# Patient Record
Sex: FEMALE | Race: BLACK OR AFRICAN AMERICAN | NOT HISPANIC OR LATINO | Employment: FULL TIME | URBAN - METROPOLITAN AREA
[De-identification: names, ages, dates, MRNs, and addresses within clinical notes are randomized per-mention and may not be internally consistent; named-entity substitution may affect disease eponyms.]

---

## 2024-03-04 ENCOUNTER — HOSPITAL ENCOUNTER (OUTPATIENT)
Facility: HOSPITAL | Age: 39
Discharge: HOME | End: 2024-03-04
Attending: EMERGENCY MEDICINE | Admitting: EMERGENCY MEDICINE
Payer: MEDICAID

## 2024-03-04 VITALS
DIASTOLIC BLOOD PRESSURE: 75 MMHG | OXYGEN SATURATION: 98 % | HEART RATE: 85 BPM | SYSTOLIC BLOOD PRESSURE: 128 MMHG | RESPIRATION RATE: 18 BRPM | BODY MASS INDEX: 33.41 KG/M2 | TEMPERATURE: 98.7 F | HEIGHT: 64 IN | WEIGHT: 195.7 LBS

## 2024-03-04 DIAGNOSIS — J10.1 INFLUENZA B: Primary | ICD-10-CM

## 2024-03-04 LAB
FLUAV SUBTYP SPEC NAA+PROBE: NOT DETECTED
FLUBV RNA ISLT QL NAA+PROBE: DETECTED
SARS-COV-2 RNA RESP QL NAA+PROBE: NOT DETECTED

## 2024-03-04 PROCEDURE — 87636 SARSCOV2 & INF A&B AMP PRB: CPT

## 2024-03-04 PROCEDURE — G0463 HOSPITAL OUTPT CLINIC VISIT: HCPCS

## 2024-03-04 RX ORDER — OSELTAMIVIR PHOSPHATE 75 MG/1
75 CAPSULE ORAL 2 TIMES DAILY
Qty: 10 CAPSULE | Refills: 0 | Status: SHIPPED | OUTPATIENT
Start: 2024-03-04 | End: 2024-03-09

## 2024-03-04 RX ORDER — ONDANSETRON 4 MG/1
4 TABLET, ORALLY DISINTEGRATING ORAL EVERY 8 HOURS PRN
Qty: 21 TABLET | Refills: 0 | Status: SHIPPED | OUTPATIENT
Start: 2024-03-04 | End: 2024-03-11

## 2024-03-04 RX ORDER — FLUTICASONE PROPIONATE 50 MCG
2 SPRAY, SUSPENSION (ML) NASAL DAILY
Qty: 11.1 ML | Refills: 0 | Status: SHIPPED | OUTPATIENT
Start: 2024-03-04

## 2024-03-04 NOTE — FSED PROVIDER NOTE
Subjective   History of Present Illness  38-year-old female reports a 1 to 2-day history of sinus pressure headache, reports drainage down the back of her throat nasal congestion occasional cough reports that a family member has had strep and another family member has fluid recently.  Denies nausea vomiting and diarrhea.        Review of Systems   All other systems reviewed and are negative.      History reviewed. No pertinent past medical history.    Allergies   Allergen Reactions    Morphine Hives and Itching       History reviewed. No pertinent surgical history.    History reviewed. No pertinent family history.    Social History     Socioeconomic History    Marital status:            Objective   Physical Exam  Constitutional:       Appearance: Normal appearance.   HENT:      Head: Normocephalic and atraumatic.      Right Ear: Tympanic membrane and ear canal normal.      Left Ear: Tympanic membrane and ear canal normal.      Nose:      Comments: Clear nasal discharge clear postnasal drainage  Eyes:      Extraocular Movements: Extraocular movements intact.      Conjunctiva/sclera: Conjunctivae normal.      Pupils: Pupils are equal, round, and reactive to light.   Cardiovascular:      Rate and Rhythm: Normal rate.      Pulses: Normal pulses.   Pulmonary:      Effort: Pulmonary effort is normal. No respiratory distress.      Breath sounds: Normal breath sounds. No stridor. No wheezing, rhonchi or rales.   Chest:      Chest wall: No tenderness.   Musculoskeletal:         General: Normal range of motion.      Cervical back: Normal range of motion.   Skin:     General: Skin is warm and dry.   Neurological:      General: No focal deficit present.      Mental Status: She is alert and oriented to person, place, and time.         Procedures           ED Course  ED Course as of 03/04/24 1647   Mon Mar 04, 2024   1606 Negative for COVID negative for influenza A positive for flu B [WF]      ED Course User Index  [WF]  Ariel Santiago Jr., APRN                                           Medical Decision Making  Patient is positive for flu B her greatest complaint is headache nasal congestion.  I am recommending Tamiflu along with Flonase we did discuss side effects and she is agreeable to discharge home with both    Problems Addressed:  Influenza B: complicated acute illness or injury    Risk  Prescription drug management.        Final diagnoses:   Influenza B       ED Disposition  ED Disposition       ED Disposition   Discharge    Condition   Stable    Comment   --               [facility] - [place]  [facility] [address]  [phone]  Schedule an appointment as soon as possible for a visit   Or follow-up with your primary care provider         Medication List        New Prescriptions      fluticasone 50 MCG/ACT nasal spray  Commonly known as: FLONASE  2 sprays into the nostril(s) as directed by provider Daily.     ondansetron ODT 4 MG disintegrating tablet  Commonly known as: ZOFRAN-ODT  Place 1 tablet on the tongue Every 8 (Eight) Hours As Needed for Nausea for up to 7 days.     oseltamivir 75 MG capsule  Commonly known as: Tamiflu  Take 1 capsule by mouth 2 (Two) Times a Day for 5 days.               Where to Get Your Medications        These medications were sent to [id number] [place] [facility] [address] AT [place] [address] [address] - [phone] [facility] [phone]   [address] [place] [address]      Phone: [phone]   fluticasone 50 MCG/ACT nasal spray  ondansetron ODT 4 MG disintegrating tablet  oseltamivir 75 MG capsule

## 2024-03-04 NOTE — Clinical Note
[facility] FSED [place] [address] [place] [address] [place] [address]  Phone: [phone]    Ji Quesada was seen and treated in our emergency department on 3/4/2024.  She may return to work on 03/06/2024.         Thank you for choosing [facility].    Matheus Brooke MD

## 2024-03-04 NOTE — Clinical Note
[facility] FS[facility] [place] [address] [place] [address] [place] [address]  Phone: [phone]    Ji Quesada was seen and treated in our emergency department on 3/4/2024.  She may return to work on 03/06/2024.         Thank you for choosing [facility].    Matheus Brooke MD

## 2024-03-04 NOTE — DISCHARGE INSTRUCTIONS
Recommend that she take Tylenol or ibuprofen every 4-6 hours on an alternating schedule.    Begin Flonase nasal spray daily    Take Tamiflu to help shorten total symptoms of flu    Take Zofran as needed for nausea    Increase your fluid intake with Gatorade Powerade    Follow-up with family doctor as needed return to ER for worsening symptom